# Patient Record
(demographics unavailable — no encounter records)

---

## 2025-07-25 NOTE — HISTORY OF PRESENT ILLNESS
[de-identified] : 54M here for initial evaluation  His prior ENT retired and he is here to establish care.  He has long standing c/o postnasal drip, nasal congestion and rhinorrhea for years. These sx can vary in severity and his postnasal drip is his biggest complaint and is incredibly bothersome to him really impacting his quality of life. He thinks the postnasal drip is worse when he eats or with allergies and he often feels like he needs to gag due to his secretions. He has been on AIT, but has had interrupted sessions, nothing more than 2 years or so uninterrupted. He uses flonase and atrovent, but there is consistent improvement in sx. The only reliable improvement in his sx as been oral steroid courses - this always helps - and he takes a Medrol suzette twice yearly.  There is hx of sinusitis, gets at least 2 infections per year and is on abx each time. There is hx of GERD, uses PPI as needed.  He has h/o limited sinus surgery over a decade ago.   No recent imaging  ROS otherwise unremarkable

## 2025-07-25 NOTE — ASSESSMENT
[FreeTextEntry1] : 54M w c/o severe, onerous postnasal drip, in addition to right>left nasal congestion and rhinorrhea for years, which mostly persist despite multiple medications and interventions. On exam, there is right>left inferior turbinate hypertrophy with right septal deviation and mild postnasal drip. Still quite symptomatic for years, no real breakthrough in managing postnasal drip despite nasla sprays, limited ESS, AIT and reflux meds. Let's restart the ball here- -To start budesonide rinses for best topical control and add astepro for any nasal allergy.  -His right sided obstruction is due to the right septal deflection and turbinate hypertrophy; will get CT sinus for completeness and RTO thereafter -Medrol for acute sx